# Patient Record
Sex: FEMALE | Race: WHITE | NOT HISPANIC OR LATINO | Employment: PART TIME | URBAN - METROPOLITAN AREA
[De-identification: names, ages, dates, MRNs, and addresses within clinical notes are randomized per-mention and may not be internally consistent; named-entity substitution may affect disease eponyms.]

---

## 2023-05-02 ENCOUNTER — OFFICE VISIT (OUTPATIENT)
Dept: URGENT CARE | Facility: URGENT CARE | Age: 43
End: 2023-05-02
Payer: COMMERCIAL

## 2023-05-02 VITALS
SYSTOLIC BLOOD PRESSURE: 113 MMHG | TEMPERATURE: 97.8 F | WEIGHT: 115 LBS | HEART RATE: 67 BPM | DIASTOLIC BLOOD PRESSURE: 69 MMHG | RESPIRATION RATE: 16 BRPM | OXYGEN SATURATION: 100 %

## 2023-05-02 DIAGNOSIS — S05.02XA ABRASION OF LEFT CORNEA, INITIAL ENCOUNTER: Primary | ICD-10-CM

## 2023-05-02 DIAGNOSIS — T15.92XA FOREIGN BODY OF LEFT EYE, INITIAL ENCOUNTER: ICD-10-CM

## 2023-05-02 PROBLEM — D50.9 ANEMIA, IRON DEFICIENCY: Status: ACTIVE | Noted: 2020-08-11

## 2023-05-02 PROBLEM — R87.610 ATYPICAL SQUAMOUS CELLS OF UNDETERMINED SIGNIFICANCE ON CYTOLOGIC SMEAR OF CERVIX (ASC-US): Status: ACTIVE | Noted: 2019-12-14

## 2023-05-02 PROBLEM — C79.51 MALIGNANT NEOPLASM METASTATIC TO BONE (H): Status: ACTIVE | Noted: 2019-06-13

## 2023-05-02 PROBLEM — M79.672 LEFT FOOT PAIN: Status: ACTIVE | Noted: 2022-11-25

## 2023-05-02 PROBLEM — G57.60 MORTON'S NEUROMA: Status: ACTIVE | Noted: 2022-11-25

## 2023-05-02 PROBLEM — C78.7 MALIGNANT NEOPLASM METASTATIC TO LIVER (H): Status: ACTIVE | Noted: 2019-06-18

## 2023-05-02 PROCEDURE — 99203 OFFICE O/P NEW LOW 30 MIN: CPT | Performed by: NURSE PRACTITIONER

## 2023-05-02 RX ORDER — DEXTROAMPHETAMINE SACCHARATE, AMPHETAMINE ASPARTATE, DEXTROAMPHETAMINE SULFATE AND AMPHETAMINE SULFATE 1.25; 1.25; 1.25; 1.25 MG/1; MG/1; MG/1; MG/1
5-15 TABLET ORAL
COMMUNITY
Start: 2023-03-01 | End: 2023-05-30

## 2023-05-02 RX ORDER — LORAZEPAM 0.5 MG/1
.5-1 TABLET ORAL
COMMUNITY
Start: 2021-12-02

## 2023-05-02 RX ORDER — LIDOCAINE/PRILOCAINE 2.5 %-2.5%
CREAM (GRAM) TOPICAL
COMMUNITY
Start: 2023-01-16

## 2023-05-02 RX ORDER — ONDANSETRON 8 MG/1
1 TABLET, FILM COATED ORAL EVERY 8 HOURS PRN
COMMUNITY
Start: 2022-10-19

## 2023-05-02 RX ORDER — CEPHALEXIN 500 MG/1
500 CAPSULE ORAL
COMMUNITY
Start: 2023-01-23

## 2023-05-02 RX ORDER — PROCHLORPERAZINE MALEATE 10 MG
10 TABLET ORAL
COMMUNITY
Start: 2021-12-02

## 2023-05-02 RX ORDER — VITAMIN B COMPLEX
1 TABLET ORAL DAILY
COMMUNITY

## 2023-05-02 RX ORDER — BIMATOPROST 3 UG/ML
SOLUTION TOPICAL
COMMUNITY
Start: 2022-06-01

## 2023-05-02 RX ORDER — FUROSEMIDE 20 MG
20 TABLET ORAL
COMMUNITY
Start: 2022-07-28

## 2023-05-02 RX ORDER — LANOLIN ALCOHOL/MO/W.PET/CERES
3 CREAM (GRAM) TOPICAL
COMMUNITY

## 2023-05-02 RX ORDER — MAGNESIUM OXIDE 400 MG/1
1 TABLET ORAL DAILY
COMMUNITY

## 2023-05-02 RX ORDER — SERTRALINE HYDROCHLORIDE 25 MG/1
1 TABLET, FILM COATED ORAL DAILY
COMMUNITY
Start: 2022-12-02 | End: 2023-12-02

## 2023-05-02 RX ORDER — POLYMYXIN B SULFATE AND TRIMETHOPRIM 1; 10000 MG/ML; [USP'U]/ML
1-2 SOLUTION OPHTHALMIC 4 TIMES DAILY
Qty: 2 ML | Refills: 0 | Status: SHIPPED | OUTPATIENT
Start: 2023-05-02 | End: 2023-05-07

## 2023-05-02 RX ORDER — LOVASTATIN 40 MG
40 TABLET ORAL
COMMUNITY
Start: 2023-04-05

## 2023-05-02 RX ORDER — NYSTATIN 500000 [USP'U]/1
TABLET, COATED ORAL
COMMUNITY
Start: 2021-10-27

## 2023-05-02 RX ORDER — LOPERAMIDE HYDROCHLORIDE 1 MG/5ML
SOLUTION ORAL
COMMUNITY

## 2023-05-02 NOTE — PATIENT INSTRUCTIONS
Suspect sand foreign body with subtle corneal abrasion  Nothing obviously remarkable on exam, but symptoms are consistent  Also slight blepharitis from rubbing the eye and dry eye  Start Polytrim drops and continue refresh or Systane  Rotate warm wet compress with cool compress  Tylenol and ibuprofen  Same-day ophthalmology if worsening pain any vision change ciliary flush

## 2023-05-02 NOTE — PROGRESS NOTES
Chief Complaint   Patient presents with     Urgent Care     Eye Problem     Something in Lt eye x6 hours, maybe sand.      SUBJECTIVE:  Nohemi Pinzon is a 43 year old female who presents to the clinic today with left eye irritation today.  She was hiking in [place] and noticed sand in her eye as it was very windy.  She has had increased gritty dread sensation in her eyeball with watery tears.  No vision change severe eyeball pain ciliary flush contact lens use yellow-green goop crust mattered lashes.  Her upper lash line is red tender and slightly edematous as she has been rubbing it.  Tried irrigating it with lubricating drops at home.    No past medical history on file.  amphetamine-dextroamphetamine (ADDERALL) 5 MG tablet, Take 5-15 mg by mouth  bimatoprost (LATISSE) 0.03 % external opthalmic solution, APPLY NIGHTLY DIRECTLY TO THE SKIN OF THE UPPER EYELID MARGIN AT THE BASE OF THE EYELASHES USING ACCOMPANYING APPLICATORS. BLOT EXCESS  cephALEXin (KEFLEX) 500 MG capsule, Take 500 mg by mouth  furosemide (LASIX) 20 MG tablet, Take 20 mg by mouth  lidocaine-prilocaine (EMLA) 2.5-2.5 % external cream, APPLY TO PORT PRIOR TO ACCESS  LORazepam (ATIVAN) 0.5 MG tablet, Take 0.5-1 mg by mouth  lovastatin (MEVACOR) 40 MG tablet, Take 40 mg by mouth  metFORMIN (GLUCOPHAGE) 500 MG tablet, TAKE ONE TABLET BY MOUTH FIVE TIMES A DAY FOR 30 DAYS  nystatin (MYCOSTATIN) 420169 units TABS tablet, START WITH TAKING 1/2 TABLET BY MOUTH TWICE DAILY AND INCREASE EVERY 2-3 DAYS TO TARGET DOSE OF 1 TABLET FOUR TIMES DAILY  ondansetron (ZOFRAN) 8 MG tablet, Take 1 tablet by mouth every 8 hours as needed  prochlorperazine (COMPAZINE) 10 MG tablet, Take 10 mg by mouth  sertraline (ZOLOFT) 25 MG tablet, Take 1 tablet by mouth daily  loperamide (IMODIUM) 1 MG/5ML liquid,   magnesium oxide 400 MG tablet, Take 1 tablet by mouth daily  melatonin 3 MG tablet, Take 3 mg by mouth  Vitamin D3 (CHOLECALCIFEROL) 25 mcg (1000 units) tablet, Take 1  tablet by mouth daily    No current facility-administered medications on file prior to visit.    Social History     Tobacco Use     Smoking status: Never     Smokeless tobacco: Never   Vaping Use     Vaping status: Not on file   Substance Use Topics     Alcohol use: Not on file     No Known Allergies    Review of Systems   All systems negative except for those listed above in HPI.    EXAM:   /69   Pulse 67   Temp 97.8  F (36.6  C) (Oral)   Resp 16   Wt 52.2 kg (115 lb)   SpO2 100%     Physical Exam  Vitals reviewed.   Constitutional:       Appearance: Normal appearance.   HENT:      Head: Normocephalic and atraumatic.   Eyes:      General:         Right eye: No discharge.         Left eye: Discharge present.     Extraocular Movements: Extraocular movements intact.      Conjunctiva/sclera: Conjunctivae normal.      Pupils: Pupils are equal, round, and reactive to light.      Comments: Left eye with watery tears and upper lash line erythematous slightly edematous and tender.  Conjunctiva without erythematous injection.   Cardiovascular:      Rate and Rhythm: Normal rate.      Pulses: Normal pulses.   Pulmonary:      Effort: Pulmonary effort is normal.   Musculoskeletal:         General: Normal range of motion.   Skin:     General: Skin is warm and dry.      Findings: No rash.   Neurological:      General: No focal deficit present.      Mental Status: She is alert and oriented to person, place, and time.   Psychiatric:         Mood and Affect: Mood normal.         Behavior: Behavior normal.       ASSESSMENT:    ICD-10-CM    1. Abrasion of left cornea, initial encounter  S05.02XA trimethoprim-polymyxin b (POLYTRIM) 84842-3.1 UNIT/ML-% ophthalmic solution      2. Foreign body of left eye, initial encounter  T15.92XA         PLAN:    Procedure: 1 drop of tetracaine instilled in eye for good anesthesia.  Fluorescein stain strip used for uptake with Woods lamp.  No obvious sand foreign bodies or corneal abrasion  was visualized.  The eye was irrigated with normal saline solution.  Patient tolerated well.    Suspect sand foreign body with subtle corneal abrasion  Nothing obviously remarkable on exam, but symptoms are consistent  Also slight blepharitis from rubbing the eye and dry eye  Start Polytrim drops and continue refresh or Systane  Rotate warm wet compress with cool compress  Tylenol and ibuprofen  Same-day ophthalmology if worsening pain any vision change ciliary flush    Follow up with primary care provider with any problems, questions or concerns or if symptoms worsen or fail to improve. Patient agreed to plan and verbalized understanding.    Serenity Amaro, [facility]